# Patient Record
Sex: FEMALE | Race: WHITE | NOT HISPANIC OR LATINO | Employment: FULL TIME | URBAN - METROPOLITAN AREA
[De-identification: names, ages, dates, MRNs, and addresses within clinical notes are randomized per-mention and may not be internally consistent; named-entity substitution may affect disease eponyms.]

---

## 2019-11-15 ENCOUNTER — HOSPITAL ENCOUNTER (OUTPATIENT)
Dept: RADIOLOGY | Facility: HOSPITAL | Age: 59
Discharge: HOME | End: 2019-11-15
Attending: NURSE PRACTITIONER
Payer: COMMERCIAL

## 2019-11-15 DIAGNOSIS — R93.1 ABNORMAL ECHOCARDIOGRAM: ICD-10-CM

## 2019-11-15 DIAGNOSIS — M54.2 CERVICALGIA: Primary | ICD-10-CM

## 2019-11-15 DIAGNOSIS — M54.2 CERVICALGIA: ICD-10-CM

## 2019-11-15 PROCEDURE — 72141 MRI NECK SPINE W/O DYE: CPT | Mod: TC

## 2020-12-21 DIAGNOSIS — R51.9 FACIAL PAIN: Primary | ICD-10-CM

## 2021-02-15 ENCOUNTER — HOSPITAL ENCOUNTER (OUTPATIENT)
Dept: RADIOLOGY | Facility: HOSPITAL | Age: 61
Discharge: HOME | End: 2021-02-15
Attending: NURSE PRACTITIONER
Payer: OTHER MISCELLANEOUS

## 2021-02-15 DIAGNOSIS — S39.93XA PELVIC INJURY, INITIAL ENCOUNTER: Primary | ICD-10-CM

## 2021-02-15 DIAGNOSIS — S39.93XA PELVIC INJURY, INITIAL ENCOUNTER: ICD-10-CM

## 2021-02-15 PROCEDURE — 72192 CT PELVIS W/O DYE: CPT | Mod: TC,PO

## 2021-07-25 PROBLEM — Z85.820 HISTORY OF MELANOMA: Status: ACTIVE | Noted: 2021-07-25

## 2021-11-01 ENCOUNTER — OFFICE VISIT (OUTPATIENT)
Dept: SURGERY | Facility: CLINIC | Age: 61
End: 2021-11-01
Payer: COMMERCIAL

## 2021-11-01 VITALS — SYSTOLIC BLOOD PRESSURE: 132 MMHG | DIASTOLIC BLOOD PRESSURE: 87 MMHG

## 2021-11-01 DIAGNOSIS — R22.2 SUBCUTANEOUS MASS OF ABDOMINAL WALL: ICD-10-CM

## 2021-11-01 DIAGNOSIS — R19.09 RIGHT GROIN MASS: Primary | ICD-10-CM

## 2021-11-01 PROCEDURE — 3079F DIAST BP 80-89 MM HG: CPT | Mod: CPTII,S$GLB,, | Performed by: SURGERY

## 2021-11-01 PROCEDURE — 1159F PR MEDICATION LIST DOCUMENTED IN MEDICAL RECORD: ICD-10-PCS | Mod: CPTII,S$GLB,, | Performed by: SURGERY

## 2021-11-01 PROCEDURE — 99203 OFFICE O/P NEW LOW 30 MIN: CPT | Mod: S$GLB,,, | Performed by: SURGERY

## 2021-11-01 PROCEDURE — 99203 PR OFFICE/OUTPT VISIT, NEW, LEVL III, 30-44 MIN: ICD-10-PCS | Mod: S$GLB,,, | Performed by: SURGERY

## 2021-11-01 PROCEDURE — 3079F PR MOST RECENT DIASTOLIC BLOOD PRESSURE 80-89 MM HG: ICD-10-PCS | Mod: CPTII,S$GLB,, | Performed by: SURGERY

## 2021-11-01 PROCEDURE — 1160F PR REVIEW ALL MEDS BY PRESCRIBER/CLIN PHARMACIST DOCUMENTED: ICD-10-PCS | Mod: CPTII,S$GLB,, | Performed by: SURGERY

## 2021-11-01 PROCEDURE — 1159F MED LIST DOCD IN RCRD: CPT | Mod: CPTII,S$GLB,, | Performed by: SURGERY

## 2021-11-01 PROCEDURE — 3075F SYST BP GE 130 - 139MM HG: CPT | Mod: CPTII,S$GLB,, | Performed by: SURGERY

## 2021-11-01 PROCEDURE — 3075F PR MOST RECENT SYSTOLIC BLOOD PRESS GE 130-139MM HG: ICD-10-PCS | Mod: CPTII,S$GLB,, | Performed by: SURGERY

## 2021-11-01 PROCEDURE — 1160F RVW MEDS BY RX/DR IN RCRD: CPT | Mod: CPTII,S$GLB,, | Performed by: SURGERY

## 2021-11-08 ENCOUNTER — HOSPITAL ENCOUNTER (OUTPATIENT)
Dept: RADIOLOGY | Facility: HOSPITAL | Age: 61
Discharge: HOME | End: 2021-11-08
Attending: SURGERY
Payer: COMMERCIAL

## 2021-11-08 DIAGNOSIS — R22.2 SUBCUTANEOUS MASS OF ABDOMINAL WALL: ICD-10-CM

## 2021-11-08 DIAGNOSIS — R19.09 RIGHT GROIN MASS: ICD-10-CM

## 2021-11-08 PROCEDURE — 76882 US LMTD JT/FCL EVL NVASC XTR: CPT | Mod: TC,PO,RT

## 2021-11-08 PROCEDURE — 76705 ECHO EXAM OF ABDOMEN: CPT | Mod: TC,PO

## 2021-11-10 ENCOUNTER — TELEPHONE (OUTPATIENT)
Dept: SURGERY | Facility: CLINIC | Age: 61
End: 2021-11-10

## 2022-11-16 ENCOUNTER — HOSPITAL ENCOUNTER (EMERGENCY)
Facility: HOSPITAL | Age: 62
Discharge: HOME | End: 2022-11-16
Attending: STUDENT IN AN ORGANIZED HEALTH CARE EDUCATION/TRAINING PROGRAM
Payer: COMMERCIAL

## 2022-11-16 VITALS
RESPIRATION RATE: 16 BRPM | SYSTOLIC BLOOD PRESSURE: 158 MMHG | WEIGHT: 125 LBS | HEIGHT: 66 IN | TEMPERATURE: 98 F | DIASTOLIC BLOOD PRESSURE: 80 MMHG | BODY MASS INDEX: 20.09 KG/M2 | OXYGEN SATURATION: 96 % | HEART RATE: 70 BPM

## 2022-11-16 DIAGNOSIS — M62.830 MUSCLE SPASM OF BACK: Primary | ICD-10-CM

## 2022-11-16 LAB
ALBUMIN SERPL BCP-MCNC: 4.6 G/DL (ref 3.5–5.2)
ALP SERPL-CCNC: 75 U/L (ref 55–135)
ALT SERPL W/O P-5'-P-CCNC: 18 U/L (ref 10–44)
ANION GAP SERPL CALC-SCNC: 6 MMOL/L (ref 8–16)
AST SERPL-CCNC: 21 U/L (ref 10–40)
BASOPHILS # BLD AUTO: 0.06 K/UL (ref 0–0.2)
BASOPHILS NFR BLD: 1.1 % (ref 0–1.9)
BILIRUB SERPL-MCNC: 1 MG/DL (ref 0.1–1)
BUN SERPL-MCNC: 18 MG/DL (ref 8–23)
CALCIUM SERPL-MCNC: 9.6 MG/DL (ref 8.7–10.5)
CHLORIDE SERPL-SCNC: 102 MMOL/L (ref 95–110)
CO2 SERPL-SCNC: 29 MMOL/L (ref 23–29)
CREAT SERPL-MCNC: 0.6 MG/DL (ref 0.5–1.4)
DIFFERENTIAL METHOD: NORMAL
EOSINOPHIL # BLD AUTO: 0.1 K/UL (ref 0–0.5)
EOSINOPHIL NFR BLD: 1.9 % (ref 0–8)
ERYTHROCYTE [DISTWIDTH] IN BLOOD BY AUTOMATED COUNT: 11.7 % (ref 11.5–14.5)
EST. GFR  (NO RACE VARIABLE): >60 ML/MIN/1.73 M^2
GLUCOSE SERPL-MCNC: 104 MG/DL (ref 70–110)
HCT VFR BLD AUTO: 40.2 % (ref 37–48.5)
HGB BLD-MCNC: 13.4 G/DL (ref 12–16)
IMM GRANULOCYTES # BLD AUTO: 0.01 K/UL (ref 0–0.04)
IMM GRANULOCYTES NFR BLD AUTO: 0.2 % (ref 0–0.5)
INR PPP: 1.1
LYMPHOCYTES # BLD AUTO: 1.9 K/UL (ref 1–4.8)
LYMPHOCYTES NFR BLD: 32.4 % (ref 18–48)
MCH RBC QN AUTO: 30.9 PG (ref 27–31)
MCHC RBC AUTO-ENTMCNC: 33.3 G/DL (ref 32–36)
MCV RBC AUTO: 93 FL (ref 82–98)
MONOCYTES # BLD AUTO: 0.5 K/UL (ref 0.3–1)
MONOCYTES NFR BLD: 9.3 % (ref 4–15)
NEUTROPHILS # BLD AUTO: 3.2 K/UL (ref 1.8–7.7)
NEUTROPHILS NFR BLD: 55.1 % (ref 38–73)
NRBC BLD-RTO: 0 /100 WBC
PLATELET # BLD AUTO: 260 K/UL (ref 150–450)
PMV BLD AUTO: 9.7 FL (ref 9.2–12.9)
POTASSIUM SERPL-SCNC: 4.2 MMOL/L (ref 3.5–5.1)
PROT SERPL-MCNC: 7.6 G/DL (ref 6–8.4)
PROTHROMBIN TIME: 13.1 SEC (ref 11.4–13.7)
RBC # BLD AUTO: 4.33 M/UL (ref 4–5.4)
SODIUM SERPL-SCNC: 137 MMOL/L (ref 136–145)
TROPONIN I SERPL HS-MCNC: 5.3 PG/ML (ref 0–14.9)
WBC # BLD AUTO: 5.71 K/UL (ref 3.9–12.7)

## 2022-11-16 PROCEDURE — 25000003 PHARM REV CODE 250: Performed by: STUDENT IN AN ORGANIZED HEALTH CARE EDUCATION/TRAINING PROGRAM

## 2022-11-16 PROCEDURE — 93005 ELECTROCARDIOGRAM TRACING: CPT | Performed by: INTERNAL MEDICINE

## 2022-11-16 PROCEDURE — 80053 COMPREHEN METABOLIC PANEL: CPT | Performed by: EMERGENCY MEDICINE

## 2022-11-16 PROCEDURE — 93010 EKG 12-LEAD: ICD-10-PCS | Mod: ,,, | Performed by: INTERNAL MEDICINE

## 2022-11-16 PROCEDURE — 99284 EMERGENCY DEPT VISIT MOD MDM: CPT | Mod: 25

## 2022-11-16 PROCEDURE — 63600175 PHARM REV CODE 636 W HCPCS: Performed by: STUDENT IN AN ORGANIZED HEALTH CARE EDUCATION/TRAINING PROGRAM

## 2022-11-16 PROCEDURE — 85025 COMPLETE CBC W/AUTO DIFF WBC: CPT | Performed by: EMERGENCY MEDICINE

## 2022-11-16 PROCEDURE — 84484 ASSAY OF TROPONIN QUANT: CPT | Performed by: EMERGENCY MEDICINE

## 2022-11-16 PROCEDURE — 85610 PROTHROMBIN TIME: CPT | Performed by: EMERGENCY MEDICINE

## 2022-11-16 PROCEDURE — 96374 THER/PROPH/DIAG INJ IV PUSH: CPT

## 2022-11-16 PROCEDURE — 93010 ELECTROCARDIOGRAM REPORT: CPT | Mod: ,,, | Performed by: INTERNAL MEDICINE

## 2022-11-16 RX ORDER — KETOROLAC TROMETHAMINE 30 MG/ML
15 INJECTION, SOLUTION INTRAMUSCULAR; INTRAVENOUS
Status: COMPLETED | OUTPATIENT
Start: 2022-11-16 | End: 2022-11-16

## 2022-11-16 RX ORDER — METHOCARBAMOL 500 MG/1
500 TABLET, FILM COATED ORAL
Status: COMPLETED | OUTPATIENT
Start: 2022-11-16 | End: 2022-11-16

## 2022-11-16 RX ORDER — METHOCARBAMOL 500 MG/1
1000 TABLET, FILM COATED ORAL 3 TIMES DAILY
Qty: 30 TABLET | Refills: 0 | Status: SHIPPED | OUTPATIENT
Start: 2022-11-16 | End: 2022-11-21

## 2022-11-16 RX ADMIN — METHOCARBAMOL TABLETS 500 MG: 500 TABLET, COATED ORAL at 02:11

## 2022-11-16 RX ADMIN — KETOROLAC TROMETHAMINE 15 MG: 30 INJECTION, SOLUTION INTRAMUSCULAR; INTRAVENOUS at 02:11

## 2022-11-16 NOTE — ED PROVIDER NOTES
Encounter Date: 11/16/2022       History     Chief Complaint   Patient presents with    Back Pain     Left upper back pain at the trap/scapula aspect x 3 days causing tingling down into the left arm. No limited ROM or pain with movement. Endorses pain all the time.      62-year-old female presents for 3 day history of left upper back pain over the trapezius, constant, worse with turning her head, nothing makes it better.  It is associated with intermittent left finger paresthesias.  She has no neck pain.  She has no weakness or numbness.  She has tried ibuprofen, Tylenol and heat packs without relief.  She is under increased stress at work.    Review of patient's allergies indicates:   Allergen Reactions    Codeine      Severe headach     Past Medical History:   Diagnosis Date    Melanoma     PONV (postoperative nausea and vomiting)      Past Surgical History:   Procedure Laterality Date    ACF  2004    with Plate    KNEE ARTHROSCOPY      TONSILLECTOMY       Family History   Problem Relation Age of Onset    Heart disease Father      Social History     Tobacco Use    Smoking status: Never    Smokeless tobacco: Never   Substance Use Topics    Alcohol use: No    Drug use: No     Review of Systems   Constitutional:  Negative for activity change, appetite change, chills, fever and unexpected weight change.   HENT:  Negative for dental problem and drooling.    Eyes:  Negative for discharge and itching.   Respiratory:  Negative for cough, chest tightness, shortness of breath, wheezing and stridor.    Cardiovascular:  Negative for chest pain, palpitations and leg swelling.   Gastrointestinal:  Negative for abdominal distention, abdominal pain, diarrhea and nausea.   Genitourinary:  Negative for difficulty urinating, dysuria, frequency and urgency.   Musculoskeletal:  Positive for myalgias. Negative for back pain, gait problem and joint swelling.   Neurological:  Negative for dizziness, syncope, numbness and headaches.    Psychiatric/Behavioral:  Negative for agitation, behavioral problems and confusion.      Physical Exam     Initial Vitals [11/16/22 1220]   BP Pulse Resp Temp SpO2   (!) 184/91 64 20 98.4 °F (36.9 °C) 98 %      MAP       --         Physical Exam    Nursing note and vitals reviewed.  Constitutional: She appears well-developed and well-nourished. She is not diaphoretic.   HENT:   Head: Normocephalic and atraumatic.   Mouth/Throat: Oropharynx is clear and moist.   Eyes: EOM are normal. Pupils are equal, round, and reactive to light. Right eye exhibits no discharge. Left eye exhibits no discharge.   Neck: No tracheal deviation present.   Normal range of motion.  Cardiovascular:  Normal rate, regular rhythm and intact distal pulses.           Pulmonary/Chest: No respiratory distress. She has no wheezes. She exhibits no tenderness.   Abdominal: Abdomen is soft. She exhibits no distension. There is no abdominal tenderness.   Musculoskeletal:         General: Tenderness present. No edema. Normal range of motion.      Cervical back: Normal range of motion.      Comments: Left trapezius tender to palpation.  No midline cervical spine tenderness.     Neurological: She is alert and oriented to person, place, and time. She has normal strength. No cranial nerve deficit or sensory deficit. GCS eye subscore is 4. GCS verbal subscore is 5. GCS motor subscore is 6.   Skin: Skin is warm and dry. No rash noted.   Psychiatric: She has a normal mood and affect. Her behavior is normal. Thought content normal.       ED Course   Procedures  Labs Reviewed   COMPREHENSIVE METABOLIC PANEL - Abnormal; Notable for the following components:       Result Value    Anion Gap 6 (*)     All other components within normal limits   CBC W/ AUTO DIFFERENTIAL   PROTIME-INR   TROPONIN I HIGH SENSITIVITY        ECG Results              EKG 12-lead (In process)  Result time 11/16/22 12:41:09      In process by Interface, Lab In [facility] (11/16/22 12:41:09)                    Narrative:    Test Reason : R07.9,    Vent. Rate : 067 BPM     Atrial Rate : 067 BPM     P-R Int : 188 ms          QRS Dur : 124 ms      QT Int : 428 ms       P-R-T Axes : 053 -09 030 degrees     QTc Int : 452 ms    Normal sinus rhythm  Anterolateral infarct ,age undetermined  Abnormal ECG  When compared with ECG of 21-MAR-2014 14:14,  Left bundle branch block is no longer Present  Anterior infarct is now Present  Anterolateral infarct is now Present    Referred By: [email or web address]   SELF           Confirmed By:                                   Imaging Results              X-Ray Chest PA And Lateral (Final result)  Result time 11/16/22 13:17:05   Procedure changed from X-Ray Chest AP Portable     Final result by Anuj Castillo MD (11/16/22 13:17:05)                   Narrative:    Reason: back pain    FINDINGS:    PA and lateral chest without comparisons show normal cardiomediastinal silhouette.  Lungs are clear. Pulmonary vasculature is normal. No acute osseous abnormality.    IMPRESSION:    No acute cardiopulmonary abnormality.    Electronically signed by:  Anuj Castillo DO  11/16/2022 1:17 PM [facility] Workstation: [id number]                                     Medications   ketorolac injection 15 mg (15 mg Intravenous Given 11/16/22 1401)   methocarbamoL tablet 500 mg (500 mg Oral Given 11/16/22 1401)                 ED Course as of 11/16/22 1947 Wed Nov 16, 2022   1350 62-year-old female with pain over the left trapezius for the past 3 days paresthesias down the left lateral 3 fingers of the left hand.  No midline tenderness to suggest occult fracture, no risk fractures for C-spine injury.  She is tender to palpation over left trapezius most consistent with a muscle spasm causing radiculopathy.  She has no neurological deficits on my exam to suggest stroke or other CNS lesion.  EKG shows left bundle-branch block and there are no acute ischemic changes and it does not meet Sgarbossa criteria for  acute coronary syndrome.  History less suggestive of ACS given it has been constant for 3 days it is worse with rotation of the neck.  Troponin within normal limits, doubt ACS.  CBC and CMP within normal limits.  Patient stable for discharge with conservative management, physical therapy referral, muscle relaxants and NSAIDs.  She is comfortable the plan.   [BS]      ED Course User Index  [BS] Jonathan Hubbard MD                 Clinical Impression:   Final diagnoses:  [M62.830] Muscle spasm of back (Primary)        ED Disposition Condition    Discharge Stable          ED Prescriptions       Medication Sig Dispense Start Date End Date Auth. Provider    methocarbamoL (ROBAXIN) 500 MG Tab Take 2 tablets (1,000 mg total) by mouth 3 (three) times daily. for 5 days 30 tablet 11/16/2022 11/21/2022 Jonathan Hubbard MD          Follow-up Information       Follow up With Specialties Details Why Contact Info    Thee Donnelly MD Family Medicine Call in 2 days To discuss recent ED visit [address] [facility] [address]  [phone]               Jonathan Hubbard MD  11/16/22 5028

## 2022-11-16 NOTE — DISCHARGE INSTRUCTIONS

## 2022-11-30 DIAGNOSIS — M54.12 BRACHIAL NEURITIS: Primary | ICD-10-CM

## 2022-12-15 DIAGNOSIS — M81.0 POSTMENOPAUSAL OSTEOPOROSIS: Primary | ICD-10-CM

## 2023-03-01 DIAGNOSIS — R13.10 SWALLOWING DISORDER: Primary | ICD-10-CM

## 2023-03-01 DIAGNOSIS — R13.10 DIFFICULTY IN SWALLOWING: Primary | ICD-10-CM

## 2023-04-12 ENCOUNTER — CLINICAL SUPPORT (OUTPATIENT)
Dept: REHABILITATION | Facility: HOSPITAL | Age: 63
End: 2023-04-12
Payer: COMMERCIAL

## 2023-04-12 DIAGNOSIS — R13.13 DYSPHAGIA, PHARYNGEAL: Primary | ICD-10-CM

## 2023-04-12 PROCEDURE — 92612 ENDOSCOPY SWALLOW (FEES) VID: CPT | Mod: PN

## 2023-04-12 NOTE — PLAN OF CARE
[facility]  Fiberoptic Endoscopic Evaluation of Swallowing (FEES)    Date: 4/12/2023     Name: Rianna Cronin   MRN: [id number]    Therapy Diagnosis: No diagnosis found.   Physician: Elizabeth Jaquez MD  Physician Orders: Ambulatory Consult to FRANCK RUTHERFORD  Order Date: 3/1/2023   Medical Diagnosis from Referral: Difficulty Swallowing    Visit #/Visits authorized: 1/ 1  Date of Evaluation:  4/12/2023   Insurance Authorization Period: 2/29/2024   Plan of Care Expiration: Evaluation Only    Time In: 0900  Time Out: 0930    Procedure Min.   Flexible fiberoptic endoscopic evaluation of  swallowing by cine or video recording (CPT 76524)  30         Precautions:Standard  Subjective   Date of Onset: 12/29/2022  History of Current Condition:  Rianna Cronin was referred by Dr. Jaquez for a FEES (CPT 43656) to objectively assess anatomy and physiology of the pharyngeal swallow, rule out silent aspiration, determine the safest possible diet, and examine the effectiveness of compensatory strategies. Patient's current nutritional avenue is oral. Patient is currently on a thin liquid diet consistency; regular food diet consistency. She presents with difficulty swallowing solids, food getting stuck during the swallow, and unintentional weight loss.     In consideration of the interrelationships between body systems and swallowing, History was provided by patient, and/or taken from chart review. The following are medical conditions present in the patient's history which can result in or be attributed to dysphagia:  Respiratory None noted in this category   Cardiovascular None noted in this category   Digestive None noted in this category   Infections  None noted in this category   Urinary None noted in this category   Endocrine None noted in this category   Nervous None noted in this category   Skeletal Anterior Cervical Discectomy and Fusion (ACDF)   Immune None noted in this category   Cancer  None noted in this category   Psychiatric  None noted in this category   Congenital  None noted in this category   Other None noted in this category     The following observations were made:   -Mental status: Alert and Cooperative  -Factors affecting performance: no difficulties participating in the study  -Feeding Method: independent in self-feeding    Respiratory Status: room air      Past Medical History: Rianna Cronin  has a past medical history of Melanoma and PONV (postoperative nausea and vomiting).  Rianna Cronin  has a past surgical history that includes Knee arthroscopy; ACF (2004); and Tonsillectomy.  Medical Hx and Allergies:  Rianna has a current medication list which includes the following prescription(s): albuterol, azithromycin, rexulti, buspirone, buspirone, buspirone, clindamycin, cyclobenzaprine, dexamethasone, famotidine, famotidine, flu vac [id number] 36mos up(pf), gabapentin, hydrocodone-acetaminophen, meloxicam, methocarbamol, methylprednisolone, montelukast, mupirocin, ondansetron, prednisone, promethazine, promethazine-dextromethorphan, sertraline, sertraline, sulfamethoxazole-trimethoprim 800-160mg, tramadol, and shingrix (pf).   Review of patient's allergies indicates:   Allergen Reactions    Codeine      Severe headach     Pneumonia History: No  Previous MBSS:  No  Previous FEES:   No  Prior Therapy:  None  Social History:  Pt lives alone  Pain:   0/10  Pain Location / Description: No pain reported  Patient's Therapy Goals:  Improve swallow  Objective     Procedure: A flexible fiberoptic nasoendoscope was passed transnasally to view the nasopharynx, oropharynx, hypopharynx, and larynx. 17 swallow trials using 1/2 teaspoon to 3 ounce amounts of real foods of thin liquid, nectar-thick liquid, puree, soft-mechanical, and solid consistencies were video recorded and analyzed using transnasal endoscopy. A clinical swallow evaluation (CPT 00608) was completed in conjunction with the  FEES in order to evaluate the oral structures required for functional swallowing.   Scope Time: 12 min 2 sec       Nasopharyngoscopic, pharyngoscopic, and laryngeal findings:  Nasopharyngoscopic Findings Velopharyngeal function WFL    Anatomic findings WNL   Pharyngoscopic & laryngoscopic findings Secretions Within normal limits    Rivera Secretion Scale 0: Most normal rating. No visible secretions anywhere in the hypopharynx or some transient bubble visible in the valleculae and pyriform sinuses.    Vocal fold motion Hyperfunction of the ventricular folds/supraglottic larynx  Appeared Pt was unable to achieve full glottal closure    Sensory integrity Appears functional- swallow initiated to penetration material, cough or throat clear to aspiration, and/or spontaneous swallow to significant residue    Anatomic findings See above       Consistency  Presentation  Findings Rosenbeck's Penetration/Aspiration Scale (PAS) Strategies   Thin (IDDSI 0) 1/2 teaspoon x1  Full teaspoon x2  Self-regulated straw sip x1   Consecutive straw sip x1   Vallecular residue: none present    Pyriform sinus residue: none present    Other residue: none present Best: (1) Material does not enter the airway    Worst: (2) Material enters the airway, remains above the vocal folds, and is ejected from the airway  Penetration occurred before the swallow over rim of the epiglottis    None completed nor needed    Nectar thick (mildly thick/IDDSI 2) 1/2 teaspoon x1  Full teaspoon x2   Vallecular residue: none present    Pyriform sinus residue: none present    Other residue: none present Best: (1) Material does not enter the airway    Worst: (2) Material enters the airway, remains above the vocal folds, and is ejected from the airway  Penetration occurred before the swallow over rim of the epiglottis   None completed nor needed    Puree (extremely thick/IDDSI 4) 1/2 teaspoon x1  Full teaspoon x1  Heaping Teaspoon x1 Vallecular residue:  Trace residue  in the right vallecular space    Pyriform sinus residue: none present    Other residue:  Trace residue on the posterior pharyngeal wall Best: (1) Material does not enter the airway    Worst: (2) Material enters the airway, remains above the vocal folds, and is ejected from the airway  Penetration occurred before the swallow over rim of the epiglottis   None completed nor needed    Mixed consistency (thin/ IDDSI 0 + soft and bite sized/ IDDSI 6) - 1 peach in juice x1  - 2 peaches in juice x1  - 3 peaches in juice x1     Vallecular residue: none present    Pyriform sinus residue: none present    Other residue: none present Best: (1) Material does not enter the airway    Worst: (2) Material enters the airway, remains above the vocal folds, and is ejected from the airway  Penetration occurred before the swallow over rim of the epiglottis   None completed nor needed    Solid (regular/ IDDSI 7) - bite of cookie x3     Visualization interrupted with cleansing swallow following first trial, likely decondary to oral residue. Unable to score 2 additional trials due to residue coating camera, cleared with liquid wash. No residue or penetration/aspiration noted following liquid wash. Best: (1) Material does not enter the airway    Worst: (2) Material enters the airway, remains above the vocal folds, and is ejected from the airway  Penetration occurred before the swallow over rim of the epiglottis   Head turn right- not effective in clearing residue any more than neutral head position         Recommend MBSS: no    Treatment   Treatment Time In: n/a  Treatment Time Out: n/a  Total Treatment Time: n/a  No treatment performed 2/2 time to administer evaluation    Education: Plan of Care, role of SLP in care, aspiration precautions , and course of medical disease affect on therapy diagnosis  were discussed with the patient. Patient and family members expressed understanding of all discussed.     Home Program: Discussed safe swallow  strategies and techniques to improve efficiency of swallow and reduce residue with solid consistencies.   Assessment   Rianna is a 63 y.o. female referred to outpatient Speech Therapy with a medical diagnosis of difficulty swallowing. Results of this FEES revealted that the pt presents with mild pharyngeal dysphagia  as defined by the dysphagia outcome and severity scale (adapted for FEES by MARLEY Millard,  Swallowing Services, [facility] from O'Kleber et al 1999).     Oral phase findings Posterior containment Within normal limits    Mastication Within normal limits    Clearance Mild residue   Pharyngeal phase findings Initiation of the swallow Timely    Base of tongue retraction Within normal limits    Epiglottic movement Inconsistent    Pharyngeal contraction Mildly reduced pharyngeal wall contraction    Laryngeal vestibule closure Functional    PES opening Within normal limits    Other findings Not applicable       Mild pharyngeal dysphagia, likely chronic related to ACDF surgery. Swallow safety is preserved; however, swallow efficiency is impaired.. Patient appears to be at low risk for aspiration related pneumonia from a primary oropharyngeal dysphagia in consideration of three pillars of aspiration pneumonia (Freeman, 2005) including oral health status, overall health/immune status, and laryngeal vestibule closure/severity of dysphagia. However, unable to assess risk related to aspiration pneumonia cause by the aspiration of gastric content. Patient at low risk for malnutrition/dehydration. Patient appears to be a good candidate for behavioral swallow rehabilitation.     Consistency Recommendations:  Thin liquids (IDDSI 0) and Soft and bite sized consistencies (IDDSI 6).     Functional Oral Intake Scale (FOIS)  The Functional Oral Intake Scale (FOIS) is an ordinal scale that is used to assess the current status and meaningful change in the oral intake. FOIS levels include:    TUBE DEPENDENT (levels 1-3) 1. No oral  intake  2. Tube dependent with minimal/inconsistent oral intake  3. Tube supplements with consistent oral intake      TOTAL ORAL INTAKE (levels 4-7) 4. Total oral intake of a single consistency  5. Total oral intake of multiple consistencies requiring special preparation  6. Total oral intake with no special preparation, but must avoid specific foods or liquid items  7. Total oral intake with no restrictions     Patient is currently judged to be at FOIS level 7 as tolerated.      Demonstrates impairments including limitations as described in the problem list.     Pt's spiritual, cultural and educational needs considered and pt agreeable to plan of care and goals.      Plan   Plan of Care Certification: 4/12/2023  to 4/12/2023    Recommended Treatment Plan: Pt is being evaluated tomorrow for follow-up care    Other Recommendations:   - Aggressive oral care at least twice daily (morning and bedtime) is strongly recommended to reduce bacteria on oropharyngeal surfaces which may increase the risk of nosocomial infections, including pneumonia.   - Monitor for any signs/symptoms of aspiration (such as wet/gurgly voice that does not clear with coughing, inability to make any voice sounds, any persistent coughing with oral intake, otherwise unexplained fever, unexplained increased or new difficulty or discomfort breathing, unexplained increase in sleepiness/lethargy/significant fatigue, unexplained increase or new onset confusion or change in cognitive functioning, or any other unexplained change in health or well-being that could be related to swallowing).  - Risk Management: use good oral hygiene , sit upright for all PO intake, increase physical mobility as tolerated, alternate bites and sips, small bites and sips, multiple swallows per bolus, remain upright for at least 1 hours following any PO intake, and eat small meals throughout the day to reduce discomfort associated with delayed emptying of the  esophagus  -Specialist Referrals: ENT  -Ancillary Tests: Consider laryngoscopy .  -Follow-up exam: Follow up swallow study is not indicated at this time.    Therapist's Name:   Annie Garcia CCC-SLP     Date: 4/12/2023

## 2023-04-12 NOTE — PROGRESS NOTES
Please see evaluation results in initial plan of care.     CHUCKIE Hinojosa, CF-SLP  Speech-Language Pathologist   4/13/2023

## 2023-04-13 ENCOUNTER — CLINICAL SUPPORT (OUTPATIENT)
Dept: REHABILITATION | Facility: HOSPITAL | Age: 63
End: 2023-04-13
Payer: COMMERCIAL

## 2023-04-13 DIAGNOSIS — R13.10 SWALLOWING DISORDER: Primary | ICD-10-CM

## 2023-04-13 DIAGNOSIS — R13.13 PHARYNGEAL DYSPHAGIA: ICD-10-CM

## 2023-04-13 PROCEDURE — 92610 EVALUATE SWALLOWING FUNCTION: CPT | Mod: PN

## 2023-04-13 NOTE — PLAN OF CARE
[facility]  Speech Therapy Evaluation -Dysphagia    Date: 4/13/2023     Name: Rianna Cronin   MRN: [id number]    Therapy Diagnosis: No diagnosis found. Physician: Elizabeth Jaquez MD  Physician Orders: Ambulatory Referral to Speech Therapy   Medical Diagnosis: Difficulty Swallowing    Visit # / Visits Authorized:  1 / 1   Date of Evaluation:  4/13/2023   Insurance Authorization Period: 3/1/2023 to 12/31/2023  Plan of Care Certification:    4/13/2023 to 6/9/2023      Time In:10:30 am    Time Out: 11:15 am    Total time: 45 minutes    Procedure   Swallow and Oral Function Evaluation      Precautions: Standard and Dysphagia  Subjective   Onset: Since most recent ACDF placement surgery.   History of Current Condition:  Rianna Cronin is a 63 y.o. female who presents to [facility] for evaluation secondary to difficulty swallowing solids, food getting stuck during the swallow, and unintentional weight loss. Patient was referred to therapy by Elizabeth Jaquez MD , which is the patient's neurosurgeon. Patient reports solids get stuck (breads and meats). She often coughs these up or uses thin liquid wash to clear. Pills sometimes get stuck, however, patient reports this has been slowly improving. She is also having difficulty swallow carbonated beverages and has to take very small sips to avoid bubbles foaming up to fill her mouth and enter her nasal cavity. She feels her voice has become hoarse and has reduced in intensity since her ACDF placement several months ago. 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 She lives alone, but her mom and sister live nearby. Patient attended today's evaluation alone.     Per Fiberoptic Endoscopic Evaluation of Swallowing note completed by Annie Garcia CCC-SLP  on 4/12/2023:     Mild pharyngeal dysphagia, likely chronic related to ACDF surgery. Swallow safety is  preserved; however, swallow efficiency is impaired.. Patient appears to be at low risk for aspiration related pneumonia from a primary oropharyngeal dysphagia in consideration of three pillars of aspiration pneumonia (Freeman, 2005) including oral health status, overall health/immune status, and laryngeal vestibule closure/severity of dysphagia. However, unable to assess risk related to aspiration pneumonia cause by the aspiration of gastric content. Patient at low risk for malnutrition/dehydration. Patient appears to be a good candidate for behavioral swallow rehabilitation.      Consistency Recommendations:  Thin liquids (IDDSI 0) and Soft and bite sized consistencies (IDDSI 6).     Other Recommendations:   - Aggressive oral care at least twice daily (morning and bedtime) is strongly recommended to reduce bacteria on oropharyngeal surfaces which may increase the risk of nosocomial infections, including pneumonia.   - Monitor for any signs/symptoms of aspiration (such as wet/gurgly voice that does not clear with coughing, inability to make any voice sounds, any persistent coughing with oral intake, otherwise unexplained fever, unexplained increased or new difficulty or discomfort breathing, unexplained increase in sleepiness/lethargy/significant fatigue, unexplained increase or new onset confusion or change in cognitive functioning, or any other unexplained change in health or well-being that could be related to swallowing).  - Risk Management: use good oral hygiene , sit upright for all PO intake, increase physical mobility as tolerated, alternate bites and sips, small bites and sips, multiple swallows per bolus, remain upright for at least 1 hours following any PO intake, and eat small meals throughout the day to reduce discomfort associated with delayed emptying of the esophagus  -Specialist Referrals: ENT  -Ancillary Tests: Consider laryngoscopy .    Past Medical History: Rianna Cronin  has a past medical  history of Melanoma and PONV (postoperative nausea and vomiting).  Rianna Cronin  has a past surgical history that includes Knee arthroscopy; ACF (2004); and Tonsillectomy.  Medical Hx and Allergies: Rianna has a current medication list which includes the following prescription(s): albuterol, azithromycin, rexulti, buspirone, buspirone, buspirone, clindamycin, cyclobenzaprine, dexamethasone, famotidine, famotidine, flu vac [id number] 36mos up(pf), gabapentin, hydrocodone-acetaminophen, meloxicam, methocarbamol, methylprednisolone, montelukast, mupirocin, ondansetron, prednisone, promethazine, promethazine-dextromethorphan, sertraline, sertraline, sulfamethoxazole-trimethoprim 800-160mg, tramadol, and shingrix (pf).   Review of patient's allergies indicates:   Allergen Reactions    Codeine      Severe headach     Prior Therapy:  none  Social History:  Patient lives alone with sister and mother nearby. Patient is currently driving and deficits do not negatively impact this.   Occupation:  .   Prior Level of Function: independent    Current Level of Function: independent  Pain Scale: no pain indicated throughout session  Patient's Therapy Goals:  Improve swallowing and stop unintentionally losing weight related to swallowing difficulties.   Objective   Formal Assessment:  Clinical Swallow Exam/ Cranial Nerve Exam:  Cranial Nerve Examination  Cranial Nerve Examination  Cranial Nerve 5: Trigeminal Nerve  Motor Jaw Posture at rest: Closed  Mandible Elevation/Depression: WFL  Mandible lateralization: WFL  Abnormal movement: absent Interpretation: Intact bilaterally    Sensory Forehead: WFL  Cheek: WFL  Jaw: WFL  Facial Pain: None noted Interpretation: Intact bilaterally      Cranial Nerve 7: Facial Nerve  Motor Facial Symmetry: WNL  Wrinkle Forehead: WFL  Close eyes tightly: WFL  Labial Protrusion: WFL  Labial Retraction: WFL  Buccal Strength with Labial Seal: WFL  Abnormal movement: absent  Interpretation: Intact bilaterally    Sensory Formal testing not completed.       Cranial Nerves IX and X: Glossopharyngeal and Vagus Nerves  Motor Palatal Symmetry (Rest): WNL  Palatal Symmetry (Movement): WNL  Cough: Perceptually strong  Voice Prior to PO intake: Clear  Resonance: Normal  Abnormal movement: absent Interpretation: Intact bilaterally      Cranial Nerve XII: Hypoglossal Nerve  Motor Tongue at rest: WNL  Lingual Protrusion: WNL  Lingual Protrusion against Resistance: WNL  Lingual Lateralization: WNL  Abnormal movement: absent Interpretation: Intact bilaterally      Other information:  Volitional Swallow: Able to palpate laryngeal rise  Mucosal Quality: No abnormal findings  Secretion Management: no overt deficits noted/observed  Dentition: Good condition for speech and mastication    Laryngeal Function Examination:  -Secretions: WNL  -Vocal Quality: clear and strong, although patient notes reduced vocal intensity and increased hoarseness since her ACDF surgery.   -Pitch Range: WNL for age and gender  -Cough: Strong      EAT-10 Score:    Eating Assessment Tool (EAT-10) is a questionnaire given to the patient to  her swallowing function.     Key: 0= no problem; 4= severe problem  1. My swallowing problem has caused me to lose weight. - 4  2. My swallowing problem interferes with my ability to go out for meals. - 4  3. Swallowing liquids takes extra effort. - 4  4. Swallowing solids takes extra effort. - 4  5. Swallowing pills takes extra effort. - 4  6. Swallowing is painful. - 0  7. The pleasure of eating is affected by my swallowing. - 4  8. When I swallow food sticks in my throat. - 4  9. I cough when I eat. - 2  10. Swallowing is stressful. - 2    Rianna ranked her swallowing function as a 32/40     Interpretation: Score of greater than 3 is indicative of a swallowing disorder (Antonio et al., 2008); higher scores correlate with increased penetration-aspiration  scale scores, and scores  greater than 15 results in the patient being 2.2 times more likely to aspirate (Chantale et al., 2015)    References:   MARIS Alvarez., JACKIE Xiong., RONNIE Tovar., OSMAN Salamanca., PRO Rivera., OSMAN Gagnon, & KATIE Rizo (2008). Validity and reliability of the Eating Assessment Tool (EAT-10). Annals of Otology, Rhinology & Laryngology, 117(12), 919-924. https://doi.org/10.1177/915447481320758493  JACKIE Kenyon., MONICA Cox., OSMAN Pereyra., MONICA Lopez, & MARIS Alvarez (2015). The ability of the 10-item eating assessment tool (EAT-10) to predict aspiration risk in persons with dysphagia. Annals of Otology, Rhinology & Laryngology, 124(5), 351-354. https://doi.org/10.1177/4023024591630513    PILL-5: (Fernie et al., 2019)    Key: 0= never; 1= almost never; 2=sometimes; 3= almost always; 4=always  1. Pills stick in my throat- 1  2. Pills stick in my chest- 0  3. I have a fear of swallowing pills- 2  4. My problem swallowing pills interferes with my ability to take my medicine- 2  5. I cant take my pills without crushing, coating, or using other forms of assistance- 1  Total score: 6/20    Interpretation*: less than 6 results in minimal or no pill dysphagia; greater than or equal to 6 but less than 11 results in mild to moderate, may manage with pill lubricants*; and greater than or equal to 11 results in moderate to severe pill dysphagia, may require an altered formulation of medication    *Referral to a swallowing specialist is warranted in individuals with a PILL-5 is greater than or  equal to 6 to rule out obstructing pathology such an esophageal or cricopharyngeal stricture or web that may be easily treatable.    Reference: RENEE Enciso, DOMINGUEZ Greenfield, RY Fu., MONICA Bernabe, BAIRON Owens, MONICA Lopez, & MARIS Alvarez. (2019). Validation of the PILL-5: a 5-item patient reported outcome measure for pill dysphagia. Frontiers in surgery, 6, 43.https://doi.org/10.3389/fsurg.2019.82190      Zonia Swallow  Protocol:  The Mary Esther Swallow Protocol was administered. The patient was alert and provided the instructions prior to the beginning of the protocol. The patient consumed 3 oz before putting the cup down. Patient drank with consecutive swallows. Patient without overt signs or symptoms of penetration/aspiration. Patient  passed the screener.    Mary Esther Swallow Protocol dictates patient remain NPO if fail screener; (Nuryr et al. 2014) however, as objective swallow assessment is not available for greater than a week, patient will remain on current diet until objective assessment is completed unless otherwise indicated.     PO Trials:   Patient presented with:   THIN:-3 oz water test, self regulated thin liquid via straw, and self regulated thin liquid via cup  PUREE:- tsp bites of pudding x2  DENTAL SOFT:- tsp bites of peaches x4  SOLID: -bite of jaswant cracker x2    *Thicken liquids were not used in this assessment. Harlan (2018) reported that thickened liquids have no sound evidence as reducing risk of pneumonia in patients with dysphagia and can cause harm by increasing risk of dehydration. It also presents an increased risk of UTI, electrolyte imbalance, constipation, fecal impaction, cognitive impairment, functional decline, and even death (Langmore, 2002; Leonor, 2016).  This supports the assertion that we should confirm a patient requires thickened liquids with an instrumental swallow study prior to recommending them.    Interpretation: The patient had no anterior loss of the bolus with adequate closure of the lips around the spoon, straw, and cup edge. No residue remained in the oral cavity following the swallow. Patient without overt clinical signs/symptoms of aspiration on PO trials given, with the exception of solids. Patient coughed with trials of solids and required thin liquid wash to clear bolus. Patient presents with a possibly inefficient swallow as indicated by inability to clear solid consistencies  without thin liquid wash. No globus sensation reported. Patient not at increased risk of silent aspiration.     Pharyngeal dysphagia suspected based on clinical bedside evaluation, likely chronic related to ACDF placement.  An instrumental swallow study via Fiberoptic Endoscopic Evaluation of the Swallow (FEES)  was completed to visualize oropharyngeal swallow function, determine least restrictive diet and appropriate treatment plan on 4/12/2023. Please see note for full details of procedure completed on that date.     Reference:   American Speech-Language-Hearing Association. (n.d.b). Adult dysphagia. (Practice Portal). https://www.lori.org/practice-portal/clinical-topics/adult-dysphagia/  ROBYN Claros. (2018). Use of modified diets to prevent aspiration in oropharyngeal dysphagia: Is current practice justified?. BMC Geriatrics, 18(1), 1-10.  ROBYN Oconnor., MEG Gallardo, MARIS Milian, & SELINA Tena. (2002). Predictors of aspiration pneumonia in nursing home residents.  Dysphagia, 17(4), 298-307.  MEG Galvez (2016). Best practices for dehydration prevention. Perspectives of the [facility]ORI Special Interest Groups - SIG 13, 1(2), 72- 80.        Treatment   Total Treatment Time Separate from Evaluation: not applicable   No treatment performed secondary to time to complete evaluation.     Education provided:   -role of Speech Therapy, goals/plan of care, scheduling/cancellations, insurance limitations with patient  -Additional Education provided:   Aspiration precautions  Results of swallow study  Swallow physiology  Swallow exercises- effortful swallow and Mendelsohn (handout provided).     Patient expressed understanding.     Home Program: Yes, completed Medelsohn and effortful swallows x30-60 daily.    Assessment     Rianna presents to [facility] status post medical diagnosis of swallowing difficulties. Patient presents with mild pharyngeal dysphagia, likely chronic related to ACDF surgery. Per results of  Fiberoptic Endoscopic Evaluation of Swallowing conducted yesterday (4/12/2023), patient's swallow safety is preserved; however, swallow efficiency is impaired.. Patient appears to be at low risk for aspiration related pneumonia from a primary oropharyngeal dysphagia in consideration of three pillars of aspiration pneumonia (Freeman, 2005) including oral health status, overall health/immune status, and laryngeal vestibule closure/severity of dysphagia. However, unable to assess risk related to aspiration pneumonia cause by the aspiration of gastric content. Patient at low risk for malnutrition/dehydration. Patient appears to be a good candidate for behavioral swallow rehabilitation.     Interpretation of objective assessment:   She presents with mild pharyngeal dysphagia, likely chronic related to ACDF surgery, characterized by throat clearing, coughing, and inability to clear solid consistencies without the use of a thin liquid wash.     Demonstrates impairments including limitations as described in the problem list.     Positive prognostic factors: intact cognitive status, patient insight into deficits, patient independence allowing her to make food modifications and follow det recommendations and safety precautions, and participation in speech therapy.   Negative prognostic factors: anatomical changes contributing to swallowing difficulties and age.   Barriers to therapy: No barriers to therapy identified.     Patient's spiritual, cultural, and educational needs considered and patient agreeable to plan of care and goals.    Patient will benefit from skilled therapy.    Rehab Potential: good      Short Term Goals: (6 weeks) Current Progress:   Patient and family members/caregivers will participate in ongoing education regarding overt signs and symptoms of penetration/aspiration and safe swallowing strategies.     Progressing/ Not Met 4/13/2023   Newly established goal     2. Patient will report consistently  performing optimal oral care at home across three sessions.      Progressing/ Not Met 4/13/2023   Newly established goal     3. Patient and family members/caregivers will demonstrate understanding of swallowing and diet  recommendations per objective evaluation of the study (Fiberoptic Endoscopic Evaluation of Swallowing 4/12/2023) across three sessions.     Progressing/ Not Met 4/13/2023   Newly established goal     4. Patient will tolerate trials of thin liquids, puree, and soft and bite sized solids x30 per session without overt signs or symptoms of penetration or aspiration independently across three sessions.      Progressing/ Not Met 4/13/2023   Newly established goal     5. Patient will implement safe swallowing recommendations and aspiration precautions with trials of thin liquids, puree, and soft and bite sized solids x30 per session independently across three sessions.     Progressing/ Not Met 4/13/2023   Newly established goal     6. Patient will complete effortful swallow technique x60 per session across three sessions.     Progressing/ Not Met 4/13/2023     Newly established goal    7. Patient will complete Mendelsohn technique x30 per session across three sessions.     Progressing/ Not Met 4/13/2023     Newly established goal    8. Patient will participate in trachea-laryngeal traction to improve swallow function.     Progressing/ Not Met 4/13/2023     Newly established goal    8. Patient will participate in voice evaluation.     Progressing/ Not Met 4/13/2023     Newly established goal        Long Term Goals: (10 weeks) Current Progress:   Patient will consume a regular diet with no overt sign/symptoms of penetration/aspiration.   Established this date     2. Patient will implement safe swallowing strategies and aspiration precautions independently.      Established this date     3. Patient will independently make modifications to diet to increase tolerance and reduce risk of penetration/aspiration.       Established this date         Plan     Recommended Treatment Plan:  Patient will participate in the [facility] program for speech therapy 1 times per week for 8 weeks to address her Swallow deficits, to educate patient and their family, and to participate in a home exercise program.    Other Recommendations:   Referral to Otolaryngology (ENT)    Therapist's Name:   CHUCKIE Hinojosa, CF-SLP  Speech-Language Pathologist   4/13/2023

## 2023-04-14 ENCOUNTER — TELEPHONE (OUTPATIENT)
Dept: OTOLARYNGOLOGY | Facility: CLINIC | Age: 63
End: 2023-04-14
Payer: COMMERCIAL

## 2023-04-14 NOTE — TELEPHONE ENCOUNTER
----- Message from Princess Garcia CCC-SLP sent at 4/14/2023 12:34 PM CDT -----  Regarding: ENT referal  Hi there,       I am seeing this patient in regards to swallowing difficulties following an ACDF placement a few months ago. During our meeting, she also complained of changes to her voice (reduced intensity and hoarseness) since the surgery and I would like to refer her to you. Whenever possible, would you please reach out to schedule an appointment with her?     Thank you,   CHUCKIE Hinojosa, CF-SLP

## 2023-04-17 NOTE — TELEPHONE ENCOUNTER
"Called pt to schedule appt. Pt states that she "is doctored out" right now. Pt declined to schedule ENT appt. Advised pt to call back if she changes her mind. Thanks, Marilee  "

## 2024-02-19 ENCOUNTER — TELEPHONE (OUTPATIENT)
Dept: UROLOGY | Facility: CLINIC | Age: 64
End: 2024-02-19
Payer: MEDICARE

## 2024-02-19 NOTE — TELEPHONE ENCOUNTER
Returned call and spoke with patient, informed we did receive referral from Dr Donnelly's office, appt made, patient verbally understood.

## 2024-02-19 NOTE — TELEPHONE ENCOUNTER
----- Message from Cj Canela sent at 2/19/2024 11:00 AM CST -----  Type:  Sooner Appointment Request    Caller is requesting a sooner appointment.  Caller declined first available appointment listed below.  Caller will not accept being placed on the waitlist and is requesting a message be sent to doctor.    Name of Caller:  pt   When is the first available appointment?  NA   Symptoms:  concerns with pts urine lab due to blood present   Would the patient rather a call back or a response via [facility]? Call back   Best Call Back Number:  [phone]    Additional Information:  pt stated she would like to be advised on if pts referral from Dr. Thee Donnelly has been received and if so pt would like to be advised in regards to getting an appt schd asap please call pt back to advise and will asap thanks!

## 2024-03-11 ENCOUNTER — OFFICE VISIT (OUTPATIENT)
Dept: UROLOGY | Facility: CLINIC | Age: 64
End: 2024-03-11
Payer: MEDICARE

## 2024-03-11 VITALS — WEIGHT: 125 LBS | HEIGHT: 66 IN | BODY MASS INDEX: 20.09 KG/M2

## 2024-03-11 DIAGNOSIS — N32.81 OVERACTIVE BLADDER: Primary | ICD-10-CM

## 2024-03-11 DIAGNOSIS — R31.21 ASYMPTOMATIC MICROSCOPIC HEMATURIA: ICD-10-CM

## 2024-03-11 LAB
BILIRUBIN, UA POC OHS: NEGATIVE
BLOOD, UA POC OHS: ABNORMAL
CLARITY, UA POC OHS: CLEAR
COLOR, UA POC OHS: YELLOW
GLUCOSE, UA POC OHS: NEGATIVE
KETONES, UA POC OHS: NEGATIVE
LEUKOCYTES, UA POC OHS: NEGATIVE
MICROSCOPIC COMMENT: NORMAL
NITRITE, UA POC OHS: NEGATIVE
PH, UA POC OHS: 7
PROTEIN, UA POC OHS: NEGATIVE
RBC #/AREA URNS AUTO: 1 /HPF (ref 0–4)
SPECIFIC GRAVITY, UA POC OHS: 1.01
UROBILINOGEN, UA POC OHS: 0.2
WBC #/AREA URNS AUTO: 0 /HPF (ref 0–5)

## 2024-03-11 PROCEDURE — 99999PBSHW POCT URINALYSIS(INSTRUMENT): Mod: PBBFAC,,,

## 2024-03-11 PROCEDURE — 99459 PELVIC EXAMINATION: CPT | Mod: PBBFAC,PO | Performed by: STUDENT IN AN ORGANIZED HEALTH CARE EDUCATION/TRAINING PROGRAM

## 2024-03-11 PROCEDURE — 99204 OFFICE O/P NEW MOD 45 MIN: CPT | Mod: S$PBB,25,, | Performed by: STUDENT IN AN ORGANIZED HEALTH CARE EDUCATION/TRAINING PROGRAM

## 2024-03-11 PROCEDURE — 51701 INSERT BLADDER CATHETER: CPT | Mod: PBBFAC,PO | Performed by: STUDENT IN AN ORGANIZED HEALTH CARE EDUCATION/TRAINING PROGRAM

## 2024-03-11 PROCEDURE — 99999 PR PBB SHADOW E&M-EST. PATIENT-LVL II: CPT | Mod: PBBFAC,,, | Performed by: STUDENT IN AN ORGANIZED HEALTH CARE EDUCATION/TRAINING PROGRAM

## 2024-03-11 PROCEDURE — 87086 URINE CULTURE/COLONY COUNT: CPT | Performed by: STUDENT IN AN ORGANIZED HEALTH CARE EDUCATION/TRAINING PROGRAM

## 2024-03-11 PROCEDURE — 99212 OFFICE O/P EST SF 10 MIN: CPT | Mod: PBBFAC,PO,25 | Performed by: STUDENT IN AN ORGANIZED HEALTH CARE EDUCATION/TRAINING PROGRAM

## 2024-03-11 PROCEDURE — 51701 INSERT BLADDER CATHETER: CPT | Mod: S$PBB,,, | Performed by: STUDENT IN AN ORGANIZED HEALTH CARE EDUCATION/TRAINING PROGRAM

## 2024-03-11 PROCEDURE — 81001 URINALYSIS AUTO W/SCOPE: CPT | Performed by: STUDENT IN AN ORGANIZED HEALTH CARE EDUCATION/TRAINING PROGRAM

## 2024-03-11 PROCEDURE — 81003 URINALYSIS AUTO W/O SCOPE: CPT | Mod: PBBFAC,91,PO | Performed by: STUDENT IN AN ORGANIZED HEALTH CARE EDUCATION/TRAINING PROGRAM

## 2024-03-11 NOTE — PROGRESS NOTES
"[facility] Note    PCP: Thee Donnelly MD    Chief Complaint: microscopic hematuria    SUBJECTIVE:       History of Present Illness:  Rianna Cronin is a 63 y.o. female who presents to clinic for hematuria. She is New  to our clinic.     Has been having significant frequency and urgency. She has urge incontinence if she does not make it to the bathroom.   Also has some MUNIRA if her bladder is full.   Has never tried anything for this.     Drinks a lot of sparkling flavored water. 3-4 cups of coffee in the am.   No soda, no alcohol.   Has a BM every day. No constipation issues.     No gross hematuria, but PCP found microscopic.     Never smoked.     Wears panty liners during the day a diaper at night.     UA today: trace blood   PVR: 90 cc (catheterized)    Last urine culture: no documented UTIs    Lab Results   Component Value Date    CREATININE 0.6 11/16/2022     Family  hx: no malignancy     Past medical, family, and social history reviewed as documented in chart with pertinent positive medical, family, and social history detailed in HPI.    Review of patient's allergies indicates:   Allergen Reactions    Codeine      Severe headach       Past Medical History:   Diagnosis Date    Melanoma     PONV (postoperative nausea and vomiting)      Past Surgical History:   Procedure Laterality Date    ACF  2004    with Plate    KNEE ARTHROSCOPY      TONSILLECTOMY       Family History   Problem Relation Age of Onset    Heart disease Father      Social History     Tobacco Use    Smoking status: Never    Smokeless tobacco: Never   Substance Use Topics    Alcohol use: No    Drug use: No        Review of Systems    OBJECTIVE:     Anticoagulation:  no    Estimated body mass index is 20.18 kg/m² as calculated from the following:    Height as of this encounter: 5' 6" (1.676 m).    Weight as of this encounter: 56.7 kg (125 lb).    Vital Signs (Most Recent)       Physical Exam  Vitals reviewed. Exam " conducted with a chaperone present.   Constitutional:       General: She is not in acute distress.     Appearance: Normal appearance. She is not ill-appearing.   HENT:      Head: Normocephalic and atraumatic.   Eyes:      General: No scleral icterus.  Pulmonary:      Effort: Pulmonary effort is normal. No respiratory distress.   Abdominal:      Palpations: Abdomen is soft.   Genitourinary:     Exam position: Lithotomy position.      Comments: Mild vaginal atrophy. No significant prolapse.   Neurological:      Mental Status: She is alert and oriented to person, place, and time.   Psychiatric:         Mood and Affect: Mood normal.         Behavior: Behavior normal.         BMP  Lab Results   Component Value Date     11/16/2022    K 4.2 11/16/2022     11/16/2022    CO2 29 11/16/2022    BUN 18 11/16/2022    CREATININE 0.6 11/16/2022    CALCIUM 9.6 11/16/2022    ANIONGAP 6 (L) 11/16/2022    ESTGFRAFRICA >60 03/21/2014    EGFRNONAA >60 03/21/2014       Lab Results   Component Value Date    WBC 5.71 11/16/2022    HGB 13.4 11/16/2022    HCT 40.2 11/16/2022    MCV 93 11/16/2022     11/16/2022       Imaging:  No pertinent recent imaging available.    ASSESSMENT     1. Overactive bladder    2. Asymptomatic microscopic hematuria      PLAN:     - Urine sent for micro and culture. Discussed need for workup if > 3 RBCs are seen.   - For her OAB symptoms. Primarily discussed avoiding bladder irritants such as carbonated water and coffee.   - If this is not effective we also discussed adding a medication. Risks and benefits of medication were discussed.   - She wants to try conservative management first and if not effective will start medication.   - Will call her with urine results and need for further testing     Naila Oleary MD     Letter to Thee Donnelly MD

## 2024-03-12 ENCOUNTER — TELEPHONE (OUTPATIENT)
Dept: UROLOGY | Facility: CLINIC | Age: 64
End: 2024-03-12
Payer: MEDICARE

## 2024-03-12 NOTE — TELEPHONE ENCOUNTER
Call placed to give urine result and advisement, no answer, unable to leave message as voicemail not set up.

## 2024-03-12 NOTE — TELEPHONE ENCOUNTER
----- Message from Naila Oleary MD sent at 3/12/2024 12:54 AM CDT -----  Please let pt know urine shows no significant hematuria. No need for further workup.

## 2024-03-13 LAB — BACTERIA UR CULT: NO GROWTH

## 2024-03-18 ENCOUNTER — TELEPHONE (OUTPATIENT)
Dept: UROLOGY | Facility: CLINIC | Age: 64
End: 2024-03-18
Payer: MEDICARE

## 2024-03-18 NOTE — TELEPHONE ENCOUNTER
----- Message from Hilda Allen sent at 3/18/2024 10:22 AM CDT -----  Contact: Patient  Type:  Test Results    Who Called:  Patient  Name of Test (Lab/Mammo/Etc):   Urinalysis  Date of Test:   3/11  Ordering Provider:   Dr Oleary  Where the test was performed:   [facility]    Would the patient rather a call back or a response via [facility]?   Call back  Best Call Back Number:   [phone]    Additional Information:    States she would like to speak with someone to get and review the results of her urine test last week - please call - thank you

## 2024-05-12 ENCOUNTER — OFFICE VISIT (OUTPATIENT)
Dept: URGENT CARE | Facility: CLINIC | Age: 64
End: 2024-05-12
Payer: MEDICARE

## 2024-05-12 VITALS
BODY MASS INDEX: 20.09 KG/M2 | HEIGHT: 66 IN | HEART RATE: 63 BPM | TEMPERATURE: 97 F | RESPIRATION RATE: 16 BRPM | WEIGHT: 125 LBS | SYSTOLIC BLOOD PRESSURE: 135 MMHG | OXYGEN SATURATION: 98 % | DIASTOLIC BLOOD PRESSURE: 81 MMHG

## 2024-05-12 DIAGNOSIS — M25.522 ELBOW PAIN, LEFT: Primary | ICD-10-CM

## 2024-05-12 PROCEDURE — 73080 X-RAY EXAM OF ELBOW: CPT | Mod: LT,S$GLB,, | Performed by: RADIOLOGY

## 2024-05-12 PROCEDURE — 99204 OFFICE O/P NEW MOD 45 MIN: CPT | Mod: S$GLB,,,

## 2024-05-12 NOTE — PROGRESS NOTES
"Subjective:      Patient ID: Rianna Cronin is a 64 y.o. female.    Vitals:  height is 5' 6" (1.676 m) and weight is 56.7 kg (125 lb). Her oral temperature is 97 °F (36.1 °C). Her blood pressure is 135/81 and her pulse is 63. Her respiration is 16 and oxygen saturation is 98%.     Chief Complaint: Elbow Pain    Pt states "she has been having pain in her elbow since yesterday. It is painful to the touch."There has no warmth noted to the area and very minimal swelling.  The pain is mostly on movement but there is some tenderness to the touch.  No history of gout but she does have history of osteoporosis and was concerned about a fracture.  Elbow x-ray pending    Elbow Pain  Associated symptoms include arthralgias and joint swelling. Pertinent negatives include no chills, fatigue or fever. She has tried heat, ice and acetaminophen (hydrocodone) for the symptoms.       Constitution: Positive for activity change. Negative for chills, fatigue and fever.   HENT: Negative.     Neck: neck negative.   Cardiovascular: Negative.    Respiratory: Negative.     Gastrointestinal: Negative.    Musculoskeletal:  Positive for joint pain, joint swelling and abnormal ROM of joint.   Skin:  Negative for erythema.   Neurological:  Positive for tingling (chronic d/t nerve in spine).   Psychiatric/Behavioral: Negative.        Objective:     Physical Exam   Constitutional: She is oriented to person, place, and time. No distress. normal  HENT:   Head: Normocephalic and atraumatic.   Ears:   Right Ear: External ear normal.   Left Ear: External ear normal.   Nose: Nose normal.   Mouth/Throat: Mucous membranes are moist. Oropharynx is clear.   Eyes: Conjunctivae are normal.   Cardiovascular: Normal rate.   Pulmonary/Chest: Effort normal. No respiratory distress.   Abdominal: Normal appearance.   Musculoskeletal:         General: Swelling (minimal) and tenderness (left elbow with movement) present. No deformity or signs of injury.      Left " elbow: She exhibits decreased range of motion and swelling. She exhibits no effusion, no deformity and no laceration. Tenderness found.        Arms:    Neurological: She is alert and oriented to person, place, and time. She displays no weakness.   Skin: Skin is warm and dry. Capillary refill takes less than 2 seconds. No erythema   Psychiatric: Her behavior is normal. Mood, judgment and thought content normal.   Nursing note and vitals reviewed.      Assessment:     1. Elbow pain, left        Plan:       Elbow pain, left  -     XR ELBOW COMPLETE 3 VIEW LEFT; Future; Expected date: 05/12/2024      Discussed conservative pain management d/t upcoming neck surgery. She cannot take NSAIDs and we discussed potential draw backs to steroids.     Discussed medication with patient who acknowledges understanding and is agreeable to POC. Follow up with primary care. Increase fluid intake. Red flags for ER discussed.

## 2025-04-23 NOTE — PROGRESS NOTES
[facility] Note    PCP: Thee Donnelly MD    Chief Complaint: microscopic hematuria    SUBJECTIVE:       History of Present Illness:  Rianna Cronin is a 65 y.o. female who presents to clinic for hematuria. She is Established  to our clinic.     Patient presents today as urine was checked by PCP and showed blood. No micro sent.   Previous microanalysis was normal.     No gross hematuria.   No dysuria.   OAB symptoms continue.     UA 3/11/24: 1 RBC    3/11/24  Has been having significant frequency and urgency. She has urge incontinence if she does not make it to the bathroom.   Also has some MUNIRA if her bladder is full.   Has never tried anything for this.     Drinks a lot of sparkling flavored water. 3-4 cups of coffee in the am.   No soda, no alcohol.   Has a BM every day. No constipation issues.     No gross hematuria, but PCP found microscopic.     Never smoked.     Wears panty liners during the day a diaper at night.     UA today: trace blood   PVR: 90 cc (catheterized)    Last urine culture: no documented UTIs    Lab Results   Component Value Date    CREATININE 0.61 05/16/2024     Family  hx: no malignancy     Past medical, family, and social history reviewed as documented in chart with pertinent positive medical, family, and social history detailed in HPI.    Review of patient's allergies indicates:   Allergen Reactions    Codeine      Severe headach       Past Medical History:   Diagnosis Date    Melanoma     PONV (postoperative nausea and vomiting)      Past Surgical History:   Procedure Laterality Date    ACF  2004    with Plate    KNEE ARTHROSCOPY      TONSILLECTOMY       Family History   Problem Relation Name Age of Onset    Heart disease Father       Social History     Tobacco Use    Smoking status: Never    Smokeless tobacco: Never   Substance Use Topics    Alcohol use: No    Drug use: No        Review of Systems    OBJECTIVE:     Anticoagulation:  no    Estimated body  "mass index is 20.18 kg/m² as calculated from the following:    Height as of this encounter: 5' 6" (1.676 m).    Weight as of this encounter: 56.7 kg (125 lb).    Vital Signs (Most Recent)       Physical Exam  Vitals reviewed. Exam conducted with a chaperone present.   Constitutional:       General: She is not in acute distress.     Appearance: Normal appearance. She is not ill-appearing.   HENT:      Head: Normocephalic and atraumatic.   Eyes:      General: No scleral icterus.  Pulmonary:      Effort: Pulmonary effort is normal. No respiratory distress.   Abdominal:      Palpations: Abdomen is soft.   Genitourinary:     Exam position: Lithotomy position.      Comments: Mild vaginal atrophy. No significant prolapse.   Neurological:      Mental Status: She is alert and oriented to person, place, and time.   Psychiatric:         Mood and Affect: Mood normal.         Behavior: Behavior normal.         BMP  Lab Results   Component Value Date     05/16/2024    K 4.2 05/16/2024     11/16/2022    CO2 27 05/16/2024    BUN 12 05/16/2024    CREATININE 0.61 05/16/2024    CALCIUM 8 (L) 05/16/2024    ANIONGAP 8 05/16/2024    ESTGFRAFRICA >60 03/21/2014    EGFRNONAA >60 03/21/2014       Lab Results   Component Value Date    WBC 5.71 11/16/2022    HGB 13.4 11/16/2022    HCT 40.2 11/16/2022    MCV 93 11/16/2022     11/16/2022       Imaging:  No pertinent recent imaging available.    ASSESSMENT     1. Asymptomatic microscopic hematuria    2. Overactive bladder      PLAN:     - Urine sent for micro and culture. Discussed need for workup if > 3 RBCs are seen.   - Discussed that if urine shows blood it needs to be sent for microscopic analysis to confirm presence of RBCs  - Will call her with results      Naila Oleary MD   "

## 2025-04-24 ENCOUNTER — OFFICE VISIT (OUTPATIENT)
Dept: UROLOGY | Facility: CLINIC | Age: 65
End: 2025-04-24
Payer: MEDICARE

## 2025-04-24 VITALS — WEIGHT: 125 LBS | BODY MASS INDEX: 20.09 KG/M2 | HEIGHT: 66 IN

## 2025-04-24 DIAGNOSIS — N32.81 OVERACTIVE BLADDER: ICD-10-CM

## 2025-04-24 DIAGNOSIS — R31.21 ASYMPTOMATIC MICROSCOPIC HEMATURIA: Primary | ICD-10-CM

## 2025-04-24 LAB
BACTERIA #/AREA URNS AUTO: ABNORMAL /HPF
BILIRUBIN, UA POC OHS: NEGATIVE
BLOOD, UA POC OHS: ABNORMAL
CLARITY, UA POC OHS: CLEAR
COLOR, UA POC OHS: YELLOW
GLUCOSE, UA POC OHS: NEGATIVE
HYALINE CASTS UR QL AUTO: 3 /LPF (ref 0–1)
KETONES, UA POC OHS: ABNORMAL
LEUKOCYTES, UA POC OHS: NEGATIVE
MICROSCOPIC COMMENT: ABNORMAL
NITRITE, UA POC OHS: NEGATIVE
PH, UA POC OHS: 6
PROTEIN, UA POC OHS: ABNORMAL
RBC #/AREA URNS AUTO: 23 /HPF (ref 0–4)
SPECIFIC GRAVITY, UA POC OHS: 1.02
SQUAMOUS #/AREA URNS AUTO: 1 /HPF
UROBILINOGEN, UA POC OHS: 0.2
WBC #/AREA URNS AUTO: 1 /HPF (ref 0–5)
YEAST UR QL AUTO: ABNORMAL /HPF

## 2025-04-24 PROCEDURE — 87086 URINE CULTURE/COLONY COUNT: CPT | Performed by: STUDENT IN AN ORGANIZED HEALTH CARE EDUCATION/TRAINING PROGRAM

## 2025-04-24 PROCEDURE — 99999 PR PBB SHADOW E&M-EST. PATIENT-LVL III: CPT | Mod: PBBFAC,,, | Performed by: STUDENT IN AN ORGANIZED HEALTH CARE EDUCATION/TRAINING PROGRAM

## 2025-04-24 PROCEDURE — 81003 URINALYSIS AUTO W/O SCOPE: CPT | Mod: PBBFAC,PO | Performed by: STUDENT IN AN ORGANIZED HEALTH CARE EDUCATION/TRAINING PROGRAM

## 2025-04-24 PROCEDURE — 99213 OFFICE O/P EST LOW 20 MIN: CPT | Mod: PBBFAC,PO | Performed by: STUDENT IN AN ORGANIZED HEALTH CARE EDUCATION/TRAINING PROGRAM

## 2025-04-24 PROCEDURE — 99213 OFFICE O/P EST LOW 20 MIN: CPT | Mod: S$PBB,,, | Performed by: STUDENT IN AN ORGANIZED HEALTH CARE EDUCATION/TRAINING PROGRAM

## 2025-04-24 PROCEDURE — 99999PBSHW POCT URINALYSIS(INSTRUMENT): Mod: PBBFAC,,,

## 2025-04-24 PROCEDURE — 81001 URINALYSIS AUTO W/SCOPE: CPT | Performed by: STUDENT IN AN ORGANIZED HEALTH CARE EDUCATION/TRAINING PROGRAM

## 2025-04-24 RX ORDER — FLUOXETINE HYDROCHLORIDE 40 MG/1
40 CAPSULE ORAL
COMMUNITY
Start: 2025-04-02

## 2025-04-24 RX ORDER — ALENDRONATE SODIUM 70 MG/1
70 TABLET ORAL
COMMUNITY
Start: 2025-04-02

## 2025-04-25 ENCOUNTER — RESULTS FOLLOW-UP (OUTPATIENT)
Dept: UROLOGY | Facility: HOSPITAL | Age: 65
End: 2025-04-25

## 2025-04-25 DIAGNOSIS — R31.21 ASYMPTOMATIC MICROSCOPIC HEMATURIA: Primary | ICD-10-CM

## 2025-04-26 LAB — BACTERIA UR CULT: NORMAL

## 2025-04-29 ENCOUNTER — CLINICAL SUPPORT (OUTPATIENT)
Dept: UROLOGY | Facility: CLINIC | Age: 65
End: 2025-04-29
Payer: MEDICARE

## 2025-04-29 DIAGNOSIS — R31.21 ASYMPTOMATIC MICROSCOPIC HEMATURIA: ICD-10-CM

## 2025-04-29 LAB
MICROSCOPIC COMMENT: NORMAL
RBC #/AREA URNS AUTO: 4 /HPF (ref 0–4)
WBC #/AREA URNS AUTO: <1 /HPF (ref 0–5)

## 2025-04-29 PROCEDURE — 99499 UNLISTED E&M SERVICE: CPT | Mod: S$PBB,,, | Performed by: STUDENT IN AN ORGANIZED HEALTH CARE EDUCATION/TRAINING PROGRAM

## 2025-04-29 PROCEDURE — 81001 URINALYSIS AUTO W/SCOPE: CPT

## 2025-04-29 NOTE — PROGRESS NOTES
Patient arrived to clinic for catheterized urine collection for micro UA. Two patient identifier done, to exam room and table. Patient draped and prepped, inserted 10 fr catheter with no difficulty, clear yellow urine returned to specimen cup, 50 ml. Patient tolerated well. Specimen prepared for lab .

## 2025-04-30 ENCOUNTER — RESULTS FOLLOW-UP (OUTPATIENT)
Dept: UROLOGY | Facility: CLINIC | Age: 65
End: 2025-04-30

## 2025-04-30 DIAGNOSIS — R31.21 ASYMPTOMATIC MICROSCOPIC HEMATURIA: Primary | ICD-10-CM

## 2025-04-30 NOTE — PROGRESS NOTES
Procedure Order to Urology [[id number]]    Electronically signed by: Naila Oleary MD on 04/30/25 1139 Status: Active   Ordering user: Naila Oleary MD 04/30/25 1139 Authorized by: Naila Oleary MD   Ordering mode: Standard   Frequency:  04/30/25 -     Diagnoses  Asymptomatic microscopic hematuria [R31.21]   Questionnaire    Question Answer   Procedure Cystoscopy Comment - 6/5   Facility Name: [facility], Please order Urine POCT without micro . Local sedation (no urine Dr Chavez or Dr oleary)

## 2025-05-02 ENCOUNTER — HOSPITAL ENCOUNTER (OUTPATIENT)
Dept: RADIOLOGY | Facility: HOSPITAL | Age: 65
Discharge: HOME | End: 2025-05-02
Attending: STUDENT IN AN ORGANIZED HEALTH CARE EDUCATION/TRAINING PROGRAM
Payer: MEDICARE

## 2025-05-02 ENCOUNTER — RESULTS FOLLOW-UP (OUTPATIENT)
Dept: UROLOGY | Facility: HOSPITAL | Age: 65
End: 2025-05-02

## 2025-05-02 DIAGNOSIS — R31.21 ASYMPTOMATIC MICROSCOPIC HEMATURIA: ICD-10-CM

## 2025-05-02 PROCEDURE — 76770 US EXAM ABDO BACK WALL COMP: CPT | Mod: 26,,, | Performed by: RADIOLOGY

## 2025-05-02 PROCEDURE — 76770 US EXAM ABDO BACK WALL COMP: CPT | Mod: TC

## 2025-05-05 ENCOUNTER — TELEPHONE (OUTPATIENT)
Dept: UROLOGY | Facility: CLINIC | Age: 65
End: 2025-05-05
Payer: MEDICARE

## 2025-05-05 NOTE — TELEPHONE ENCOUNTER
----- Message from Naila Oleary MD sent at 5/2/2025  9:06 PM CDT -----  No abnormalities on US.   ----- Message -----  From: Interface, Rad Results In  Sent: 5/2/2025   4:12 PM CDT  To: Naila Oleary MD